# Patient Record
Sex: MALE | Race: WHITE | NOT HISPANIC OR LATINO | Employment: FULL TIME | URBAN - METROPOLITAN AREA
[De-identification: names, ages, dates, MRNs, and addresses within clinical notes are randomized per-mention and may not be internally consistent; named-entity substitution may affect disease eponyms.]

---

## 2017-01-13 ENCOUNTER — OFFICE VISIT (OUTPATIENT)
Dept: ALLERGY | Facility: CLINIC | Age: 57
End: 2017-01-13
Payer: COMMERCIAL

## 2017-01-13 VITALS
SYSTOLIC BLOOD PRESSURE: 110 MMHG | HEART RATE: 75 BPM | HEIGHT: 68 IN | WEIGHT: 251.13 LBS | DIASTOLIC BLOOD PRESSURE: 72 MMHG | BODY MASS INDEX: 38.06 KG/M2 | OXYGEN SATURATION: 95 %

## 2017-01-13 DIAGNOSIS — R51.9 FREQUENT HEADACHES: Chronic | ICD-10-CM

## 2017-01-13 DIAGNOSIS — H10.45 CONJUNCTIVITIS, CHRONIC ALLERGIC: Chronic | ICD-10-CM

## 2017-01-13 DIAGNOSIS — R05.3 CHRONIC COUGH: Chronic | ICD-10-CM

## 2017-01-13 DIAGNOSIS — J32.8 CHRONIC FRONTOETHMOIDAL SINUSITIS: Primary | Chronic | ICD-10-CM

## 2017-01-13 DIAGNOSIS — Z98.890 S/P SINUS SURGERY: ICD-10-CM

## 2017-01-13 DIAGNOSIS — J30.89 ALLERGIC RHINITIS DUE TO HOUSE DUST MITE: Chronic | ICD-10-CM

## 2017-01-13 PROCEDURE — 1159F MED LIST DOCD IN RCRD: CPT | Mod: S$GLB,,, | Performed by: ALLERGY & IMMUNOLOGY

## 2017-01-13 PROCEDURE — 99999 PR PBB SHADOW E&M-EST. PATIENT-LVL III: CPT | Mod: PBBFAC,,, | Performed by: ALLERGY & IMMUNOLOGY

## 2017-01-13 PROCEDURE — 99214 OFFICE O/P EST MOD 30 MIN: CPT | Mod: S$GLB,,, | Performed by: ALLERGY & IMMUNOLOGY

## 2017-01-15 PROBLEM — R51.9 FREQUENT HEADACHES: Chronic | Status: ACTIVE | Noted: 2017-01-15

## 2017-01-15 PROBLEM — J32.8 CHRONIC FRONTOETHMOIDAL SINUSITIS: Chronic | Status: ACTIVE | Noted: 2017-01-15

## 2017-01-15 PROBLEM — H10.45 CONJUNCTIVITIS, CHRONIC ALLERGIC: Chronic | Status: ACTIVE | Noted: 2017-01-15

## 2017-01-15 PROBLEM — J30.89 ALLERGIC RHINITIS DUE TO HOUSE DUST MITE: Chronic | Status: ACTIVE | Noted: 2017-01-15

## 2017-01-15 PROBLEM — Z98.890 S/P SINUS SURGERY: Status: ACTIVE | Noted: 2017-01-15

## 2017-01-15 PROBLEM — R05.3 CHRONIC COUGH: Chronic | Status: ACTIVE | Noted: 2017-01-15

## 2017-01-15 NOTE — PROGRESS NOTES
Referring physician: Self Referral    Primary Care Physician: Primary Doctor No    Chief Complaint: Follow-up (congestion, coughing up phlegm, dark yellow or green in color)    Patient is known to me. The last visit was in private practice on 3/30/2016  Patient is accompanied: No  Diagnosis at previous visit:  1.  Perennial ALLERGIC rhinitis: Symptomatic  2.  Recurrent chronic rhinosinusitis: Symptomatic  3.  Status post sinus balloon surgery November 2015  4.  Frequent headaches related to one and 2.  5.  ALLERGIC conjunctivitis intermittent symptoms    Subjective:         YAS Rasheed is a 56 y.o. male here for a follow-up visit related to chronic nasal symptoms, cough, and sinusitis despite medications.  At his last visit on 3/30/2016 he was prescribed a prednisone pulse with a taper, Cipro 750 mg twice a day for 30 days and Bactroban ointment in his nose twice a day for 30 days.  He had joint pain with the Cipro so that was discontinued after just a few days.  There was a phone call on 6/28/2016 that he was continuing to have nasal and sinus problems.  He was told to see Dr. Malcolm and get nasal sinus cultures so that culture results could guide antibiotic treatment.  He was also told to use a over-the-counter nasal steroid 2 sprays in each nostril no morning as well as budesonide 0.5 mg per ampule with one half of each ampule in his nose while his head was vertical to the floor in the evening.  Patient currently continues to have his chronic symptoms of nasal congestion, postnasal drip, coughing and production of a small amount of phlegm.  It is discolored in nature and usually worse in the morning.  He also reports having frequent headaches.  These symptoms have been relatively persistent since his previous visit to see us in March 2016.  Patient reports that he is currently on immunotherapy with Dr. Malcolm.    Patient denies having ocular symptoms at this time including red watery itchy  eyes  Patient denies having asthma or wheeze or exercise-induced symptoms.      Review of Systems  Constitutional: Negative for changes in appetite, unintentional weight loss, fever, chills and fatigue.   HENT: Negative for facial pain, nose bleeds,and voice change. Negative for ear discharge, ear pain, facial swelling, sore throat and trouble swallowing.  Positive for nasal congestion postnasal drip throat clearing and sinus pressure as well as rhinorrhea  Eyes: Negative for occular discharge, redness, itching and visual disturbance.  Respiratory: Negative for chest tightness, shortness of breath, wheezing, dyspnea on exertion.  Positive for cough which is semi-productive yellow-green phlegm.  Cardiovascular: Negative for chest pain, palpatations and leg swelling.  Gastrointestinal: Negative for abdominal distension, abdominal pain, constipation, diarrhea, nausea,and vomiting.   Genitourinary: Negative for difficulty urinating.   Musculoskeletal: Negative for arthralgias, gait problems, joint swelling, myalgias and back pain.   Neurological: Negative for dizziness, syncope, weakness, light-headedness.  Positive for frequent headaches  Hematological: Negative for adenopathy, does not bruise or bleed easily.  Psychiatric/Behavioral: Negative for agitation, anxiety, behavioral problems, confusion, and insomnia.  Skin: Negative for rash.     PMH:  Reviewed with the patient and current for this visit    Family History:  Reviewed with the patient and current for this visit    Social History:  Reviewed with the patient and current for this visit     Environmental History:  Reviewed with the patient and current for this visit          Objective:      Skin Test results: Patient has positive prick skin test house dust mites    Immunotherapy: Patient has been on immunotherapy in the past from 11/3011 28/8/2012.  There was premature discontinuation of his immunotherapy and he never achieved maintenance dose.    Physical  Exam  General:patient is well developed and well nourished, in no acute distress.  Mental Status:  Alert, oriented and cooperative  Head and Face: normocephalic   Allergic shiners: No  Eyes:   Pupils: ERRLA: Scleral conjunctiva: clear; Cornea: clear; Palprebal conjunctiva: normal: Eyelid Skin: normal  Ears:Tympanic membrane Left:intact and normal light reflex; Right:intact and normal light reflex; External canals normal bilaterally.  Nose:  Nares: patent; Mucosa :pink and moist; Nasal septum: midline;Turbinates are of normal size bilaterally and do not occlude the nasal airway.  Mouth/Pharynx: tonsils present; posterior pharyngeal wall normal; tongue normal; teeth normal; voice quality normal.  Neck:  Cervical lymph nodes: small, non-tender, freely moveable both anterior and posterior cervical chain; Trachea: midline; Masses: none  Lungs: Air movement is good; respiratory effort is good; no respiratory distress; breath sounds are vesicular in all lung fields; no wheezing; normal expiratory time; no cough.  Heart: regular rate and rhythm with mild respiratory variation; A1 and P2 are normal; no murmurs or gallops.  Abdomen:exam not done  Extremities: no cyanosis, clubbing or edema  Skin:no rashes or lesions present; skin hydrated and supple.        Laboratory Results: Immune studies done on 1/28/2015 demonstrated a low IgG subclass 1 at 275 MG/DL he also had 5 out of 23 pneumococcal serotypes at protective levels, his Haemophilus influenza B antibody level was unprotected, tetanus was at protective levels and psoas diphtheria.  His complement levels were normal as well as his neutrophils function.  He also had normal total levels were IgG A, IgG G, and IgM.  His IgE level was 77.  On 12/28/2015 is post-pneumococcal titers were protective at 18/23.  He never received his some off was influenza B vaccine.            Assessment:       1. Chronic frontoethmoidal sinusitis     2. Chronic cough     3. Allergic rhinitis due  to house dust mite     4. Frequent headaches     5. Conjunctivitis, chronic allergic     6. S/P sinus surgery             Plan:         Return for re-visit: Return in about 2 months (around 3/13/2017).    Immunotherapy: Yes; continue immunotherapy as Dr. Malcolm for now    Lab or X-ray: No; but will consider CT of the sinus at a later date    Outside medical records requested: No        Patient Instructions   1.  Patient is to see Dr. Malcolm to obtain sinus cultures for culture driven antibiotic therapy.  He will continue to use nasal saline  2.  After culture and sensitivities are available then I will start an appropriate antibiotic, along with an oral prednisone pulse, also budesonide 0.5 mL per ampule one half ampule in each nostril at bedtime.  I will also start an a.m. nasal steroid.  3.  Patient will call prior to revisit if there are problems.      25 minutes spent face to face with this patient. 50% of time spent counseling this patient about the medical conditions (pathophysiology), the options and strategies for treatments, and the risks and benefits of treatments.    Patient education content included:  Nasal sinus physiology in the role of inflammatory changes in the production of nasal symptoms sinus symptoms and sinus infections.  The importance of obtaining bacterial cultures to determine appropriate antibiotic choices.  The importance of using coordinated medical management to control the courtesy the symptoms.  The fact that the previous immunological evaluation did not demonstrate any immune issue which needs to be remediated related to his chronic infection.  Patient voiced understanding of these issues and questions were answered.        Cheri Hood MD

## 2017-01-15 NOTE — PATIENT INSTRUCTIONS
1.  Patient is to see Dr. Malcolm to obtain sinus cultures for culture driven antibiotic therapy.  He will continue to use nasal saline  2.  After culture and sensitivities are available then I will start an appropriate antibiotic, along with an oral prednisone pulse, also budesonide 0.5 mL per ampule one half ampule in each nostril at bedtime.  I will also start an a.m. nasal steroid.  3.  Patient will call prior to revisit if there are problems.

## 2017-03-31 ENCOUNTER — TELEPHONE (OUTPATIENT)
Dept: ALLERGY | Facility: CLINIC | Age: 57
End: 2017-03-31

## 2017-03-31 NOTE — TELEPHONE ENCOUNTER
Patient sent me text message that he stated with sinusitis on Tuesday this week 3/28/2017.  Will Rx doxycycline 100 mg BID for 14 days.-[id number]

## 2017-04-07 ENCOUNTER — TELEPHONE (OUTPATIENT)
Dept: ALLERGY | Facility: CLINIC | Age: 57
End: 2017-04-07

## 2017-04-07 NOTE — TELEPHONE ENCOUNTER
----- Message from Anil Storey sent at 3/31/2017  3:36 PM CDT -----  Contact: self [phone]  Patient is calling to check states of medication . Please advise , Thanks !

## 2017-04-07 NOTE — TELEPHONE ENCOUNTER
Patient is calling about meds.  i am not sure what you would like for him to have other than prednisone, budesonide and some type of antibiotic after he has a culture and sensitivity done. Unfortunately i do not see where any type of culture was done.  lLeft message for patient to call and let us know what med he is talking about.

## 2017-04-07 NOTE — TELEPHONE ENCOUNTER
----- Message from Shruthi Acevedo sent at 3/31/2017  8:51 AM CDT -----  Contact: Self/[phone]  Patient would like to get medical advice.  Symptoms :  Sinus infection  How long has patient had these symptoms:    [facility] and [facility] #[facility] [phone]  Any drug allergies:  On file   Comments: pt said that he has a sinus infection and wants to know if he can have a rx sent to the pharmacy. Please call and advise        Thank you

## 2017-04-07 NOTE — TELEPHONE ENCOUNTER
LOV-1/13/17.  i do not see where this medication has been ordered by you.  Cannot pend.  Would you like him to have it.

## 2017-04-07 NOTE — TELEPHONE ENCOUNTER
----- Message from Noemi Terrell MA sent at 4/5/2017  3:38 PM CDT -----  Contact: [facility] fax [phone]      ----- Message -----     From: Paradise Khalil MA     Sent: 4/5/2017   2:21 PM       To: [facility]anna Tee Staff    Type: Rx    Name of medication(s): Mupirocin 2% Ointment    Is this a refill? New rx? refill    Who prescribed medication?Cynthia Reed      Pharmacy Name, Phone, & Location:[facility] on file    Comments:Please advise

## 2017-04-07 NOTE — TELEPHONE ENCOUNTER
----- Message from Paradise Khalil MA sent at 4/5/2017  2:21 PM CDT -----  Contact: [facility] fax [phone]  Type: Rx    Name of medication(s): Mupirocin 2% Ointment    Is this a refill? New rx? refill    Who prescribed medication?Cynthia Reed      Pharmacy Name, Phone, & Location:[facility] on file    Comments:Please advise

## 2017-04-19 ENCOUNTER — TELEPHONE (OUTPATIENT)
Dept: INTERNAL MEDICINE | Facility: CLINIC | Age: 57
End: 2017-04-19
Payer: COMMERCIAL

## 2017-04-19 PROCEDURE — 99499 UNLISTED E&M SERVICE: CPT | Mod: S$GLB,,, | Performed by: ALLERGY & IMMUNOLOGY

## 2017-04-19 RX ORDER — MUPIROCIN 20 MG/G
OINTMENT TOPICAL 3 TIMES DAILY
Qty: 30 G | Refills: 0 | Status: SHIPPED | OUTPATIENT
Start: 2017-04-19

## 2017-04-19 NOTE — TELEPHONE ENCOUNTER
Pt would like a refill on Mupirocin 2% ointment. Pt would like this refill sent to the [facility] on file.   Please advise

## 2017-04-19 NOTE — TELEPHONE ENCOUNTER
----- Message from Paradise Khalil MA sent at 4/18/2017  4:43 PM CDT -----  Contact: [facility] [phone]   Type: Rx    Name of medication(s): Mupirocin 2% ointment    Is this a refill? New rx? refill    Who prescribed medication?Cynthia Reed    Pharmacy Name, Phone, & Location:[facility] on file    Comments:please advise    Thanks

## 2017-06-16 RX ORDER — DOXYCYCLINE 100 MG/1
100 CAPSULE ORAL 2 TIMES DAILY
Refills: 1 | Status: CANCELLED | OUTPATIENT
Start: 2017-06-16

## 2017-06-16 RX ORDER — DOXYCYCLINE 100 MG/1
100 CAPSULE ORAL 2 TIMES DAILY
Refills: 1 | COMMUNITY
Start: 2017-03-31

## 2017-06-24 NOTE — TELEPHONE ENCOUNTER
At his last visit I told him to get cultures for culture driven antibiotics. Since there are several requests for antibiotics since the 1/13/2017 visit, my advise is still the same.Please call patient and advise him. Thanks    Plan from 1/13/2017:  1.  Patient is to see Dr. Malcolm to obtain sinus cultures for culture driven antibiotic therapy.  He will continue to use nasal saline  2.  After culture and sensitivities are available then I will start an appropriate antibiotic, along with an oral prednisone pulse, also budesonide 0.5 mL per ampule one half ampule in each nostril at bedtime.  I will also start an a.m. nasal steroid.  3.  Patient will call prior to revisit if there are problems.

## 2017-06-26 NOTE — TELEPHONE ENCOUNTER
Patient was just seen by Dr. Le on 5/11/17.  Office is sending copy of lab reports and office visit to us.

## 2017-08-08 DIAGNOSIS — J01.00 ACUTE NON-RECURRENT MAXILLARY SINUSITIS: ICD-10-CM

## 2017-08-08 RX ORDER — MONTELUKAST SODIUM 10 MG/1
10 TABLET ORAL NIGHTLY
Qty: 90 TABLET | Refills: 3 | OUTPATIENT
Start: 2017-08-08 | End: 2018-08-08

## 2017-08-08 RX ORDER — MONTELUKAST SODIUM 10 MG/1
10 TABLET ORAL NIGHTLY
Qty: 90 TABLET | Refills: 3 | Status: SHIPPED | OUTPATIENT
Start: 2017-08-08 | End: 2018-08-08

## 2017-08-08 RX ORDER — CLARITHROMYCIN 500 MG/1
TABLET, FILM COATED, EXTENDED RELEASE ORAL
Qty: 60 TABLET | Refills: 0 | OUTPATIENT
Start: 2017-08-08

## 2017-08-09 ENCOUNTER — PATIENT MESSAGE (OUTPATIENT)
Dept: ALLERGY | Facility: CLINIC | Age: 57
End: 2017-08-09

## 2017-08-09 NOTE — TELEPHONE ENCOUNTER
Patient notified that Dr. Hood  Is currently on FMLA and per Lorene Adkins, she stated that he would need to be seen in our office before antibiotics could be ordered for him.

## 2017-08-15 RX ORDER — MONTELUKAST SODIUM 10 MG/1
10 TABLET ORAL NIGHTLY
Qty: 90 TABLET | Refills: 3 | Status: CANCELLED | OUTPATIENT
Start: 2017-08-15 | End: 2018-08-15

## 2017-08-16 ENCOUNTER — PATIENT MESSAGE (OUTPATIENT)
Dept: ALLERGY | Facility: CLINIC | Age: 57
End: 2017-08-16

## 2017-08-27 RX ORDER — DOXYCYCLINE 100 MG/1
CAPSULE ORAL
Qty: 28 CAPSULE | Refills: 1 | OUTPATIENT
Start: 2017-08-27

## 2017-09-21 ENCOUNTER — PATIENT MESSAGE (OUTPATIENT)
Dept: ALLERGY | Facility: CLINIC | Age: 57
End: 2017-09-21

## 2017-10-26 RX ORDER — MONTELUKAST SODIUM 10 MG/1
TABLET ORAL
Qty: 330 TABLET | Refills: 0 | OUTPATIENT
Start: 2017-10-26

## 2018-05-17 ENCOUNTER — CLINICAL SUPPORT (OUTPATIENT)
Dept: OTHER | Facility: CLINIC | Age: 58
End: 2018-05-17
Payer: COMMERCIAL

## 2018-05-17 DIAGNOSIS — Z00.8 HEALTH EXAMINATION IN POPULATION SURVEYS: Primary | ICD-10-CM

## 2018-05-17 PROCEDURE — 99401 PREV MED CNSL INDIV APPRX 15: CPT | Mod: S$GLB,,, | Performed by: INTERNAL MEDICINE

## 2018-05-17 PROCEDURE — 82947 ASSAY GLUCOSE BLOOD QUANT: CPT | Mod: QW,S$GLB,, | Performed by: INTERNAL MEDICINE

## 2018-05-17 PROCEDURE — 80061 LIPID PANEL: CPT | Mod: QW,S$GLB,, | Performed by: INTERNAL MEDICINE

## 2018-05-18 VITALS
HEIGHT: 68 IN | DIASTOLIC BLOOD PRESSURE: 77 MMHG | SYSTOLIC BLOOD PRESSURE: 117 MMHG | WEIGHT: 204 LBS | BODY MASS INDEX: 30.92 KG/M2

## 2018-05-18 LAB
GLUCOSE SERPL-MCNC: 99 MG/DL (ref 60–140)
POC CHOLESTEROL, HDL: 47 MG/DL (ref 40–?)
POC CHOLESTEROL, LDL: 168 MG/DL (ref ?–160)
POC CHOLESTEROL, TOTAL: 234 MG/DL (ref ?–240)
POC GLUCOSE FASTING: ABNORMAL MG/DL (ref 60–110)
POC TOTAL CHOLESTEROL / HDL RATIO: 4.98 (ref ?–6)
POC TRIGLYCERIDES: 95 MG/DL (ref ?–160)

## 2018-05-31 NOTE — PROGRESS NOTES
Patient consulted at health screening per SUBHASH Lombardi RN. Discussed dietary changes decrease LDL. Only sees PCP when needed. Encouraged annual visits.

## 2023-01-10 ENCOUNTER — OFFICE VISIT (OUTPATIENT)
Dept: OPHTHALMOLOGY | Facility: CLINIC | Age: 63
End: 2023-01-10
Attending: OPHTHALMOLOGY
Payer: COMMERCIAL

## 2023-01-10 DIAGNOSIS — H25.13 NUCLEAR SCLEROSIS, BILATERAL: Primary | ICD-10-CM

## 2023-01-10 PROCEDURE — 1160F PR REVIEW ALL MEDS BY PRESCRIBER/CLIN PHARMACIST DOCUMENTED: ICD-10-PCS | Mod: CPTII,S$GLB,, | Performed by: OPHTHALMOLOGY

## 2023-01-10 PROCEDURE — 99999 PR PBB SHADOW E&M-NEW PATIENT-LVL II: CPT | Mod: PBBFAC,,, | Performed by: OPHTHALMOLOGY

## 2023-01-10 PROCEDURE — 1159F MED LIST DOCD IN RCRD: CPT | Mod: CPTII,S$GLB,, | Performed by: OPHTHALMOLOGY

## 2023-01-10 PROCEDURE — 1159F PR MEDICATION LIST DOCUMENTED IN MEDICAL RECORD: ICD-10-PCS | Mod: CPTII,S$GLB,, | Performed by: OPHTHALMOLOGY

## 2023-01-10 PROCEDURE — 92004 COMPRE OPH EXAM NEW PT 1/>: CPT | Mod: S$GLB,,, | Performed by: OPHTHALMOLOGY

## 2023-01-10 PROCEDURE — 92004 PR EYE EXAM, NEW PATIENT,COMPREHESV: ICD-10-PCS | Mod: S$GLB,,, | Performed by: OPHTHALMOLOGY

## 2023-01-10 PROCEDURE — 99999 PR PBB SHADOW E&M-NEW PATIENT-LVL II: ICD-10-PCS | Mod: PBBFAC,,, | Performed by: OPHTHALMOLOGY

## 2023-01-10 PROCEDURE — 1160F RVW MEDS BY RX/DR IN RCRD: CPT | Mod: CPTII,S$GLB,, | Performed by: OPHTHALMOLOGY

## 2023-01-10 NOTE — PROGRESS NOTES
HPI     Cataract            Comments: Jed Rasheed is a 63 y/o male           Comments    Pt here for Cataract Evaluation   Pt state need more light to see.   Pt denies f/f. No other complaints at this time           Last edited by Julissa Gonzalez on 1/10/2023  1:46 PM.            Assessment /Plan     For exam results, see Encounter Report.    Nuclear sclerosis, bilateral    Incipient cataract: Patient does reports mild visual decline, but not sufficient to affect activities of daily living. I recommend monitoring visual status and follow up when visual symptoms worsen.

## 2024-02-05 ENCOUNTER — TELEPHONE (OUTPATIENT)
Dept: OPHTHALMOLOGY | Facility: CLINIC | Age: 64
End: 2024-02-05
Payer: COMMERCIAL

## 2024-02-05 NOTE — TELEPHONE ENCOUNTER
Pt called c/o new floater OS No headaches, flashes, curtain or veil. [facility] OU no decrease in one year. Chris appt for tomorrow ret eval

## 2024-02-06 ENCOUNTER — OFFICE VISIT (OUTPATIENT)
Dept: OPHTHALMOLOGY | Facility: CLINIC | Age: 64
End: 2024-02-06
Payer: COMMERCIAL

## 2024-02-06 DIAGNOSIS — H43.821 VITREOMACULAR ADHESION, RIGHT: ICD-10-CM

## 2024-02-06 DIAGNOSIS — H43.12 VITREOUS HEMORRHAGE, LEFT: ICD-10-CM

## 2024-02-06 DIAGNOSIS — H43.812 POSTERIOR VITREOUS DETACHMENT, LEFT: Primary | ICD-10-CM

## 2024-02-06 DIAGNOSIS — H25.13 AGE-RELATED NUCLEAR CATARACT OF BOTH EYES: ICD-10-CM

## 2024-02-06 PROCEDURE — 99999 PR PBB SHADOW E&M-EST. PATIENT-LVL I: CPT | Mod: PBBFAC,,, | Performed by: OPHTHALMOLOGY

## 2024-02-06 PROCEDURE — 99214 OFFICE O/P EST MOD 30 MIN: CPT | Mod: S$GLB,,, | Performed by: OPHTHALMOLOGY

## 2024-02-06 PROCEDURE — 92201 OPSCPY EXTND RTA DRAW UNI/BI: CPT | Mod: S$GLB,,, | Performed by: OPHTHALMOLOGY

## 2024-02-06 NOTE — PROGRESS NOTES
HPI    Here for floater check. New floaters left eye yday  Last edited by Pavel Valera MD on 2/6/2024  2:25 PM.         A/P    ICD-10-CM ICD-9-CM   1. Posterior vitreous detachment, left  H43.812 379.21   2. Vitreous hemorrhage, left  H43.12 379.23   3. Vitreomacular adhesion, right  H43.821 379.27   4. Age-related nuclear cataract of both eyes  H25.13 366.16       1. Posterior vitreous detachment, left  2. Vitreous hemorrhage, left  Here for new floater eval   Noted new floaters yday, no trauma    Exam notable for hemorrhagic PVD, no RT/RD with , scant heme in, no RT  Plan: Observation, counseled on RT/RD risk given fresh PVD, monitor, recheck 1 mo   Pathology of PVD, Retinal Tear, Retinal Detachment reviewed in great detail  RD precautions discussed in detail, patient expressed understanding  RTC immediately PRN (especially ANY change flashes, floaters, vision, visual field)     3. Vitreomacular adhesion, right  No RT/RD, attached  Plan: Observation, counseled on future PVD symptoms     4. Age-related nuclear cataract of both eyes  Mild NS, NVS  Plan: Observation Update Mrx prn     RTC 1 mo DFE/OCTm OU monitor heme/floaters OS         I saw and examined the patient and reviewed in detail the findings documented. The final examination findings, image interpretations which have been independently interpreted, and plan as documented in the record represent my personal judgment and conclusions.    Pavel Valera MD  Vitreoretinal Surgery   [facility]

## 2024-03-08 ENCOUNTER — OFFICE VISIT (OUTPATIENT)
Dept: OPHTHALMOLOGY | Facility: CLINIC | Age: 64
End: 2024-03-08
Payer: COMMERCIAL

## 2024-03-08 ENCOUNTER — TELEPHONE (OUTPATIENT)
Dept: OPTOMETRY | Facility: CLINIC | Age: 64
End: 2024-03-08
Payer: COMMERCIAL

## 2024-03-08 DIAGNOSIS — H43.821 VITREOMACULAR ADHESION, RIGHT: ICD-10-CM

## 2024-03-08 DIAGNOSIS — H43.12 VITREOUS HEMORRHAGE, LEFT: ICD-10-CM

## 2024-03-08 DIAGNOSIS — H43.812 POSTERIOR VITREOUS DETACHMENT, LEFT: Primary | ICD-10-CM

## 2024-03-08 DIAGNOSIS — H25.13 AGE-RELATED NUCLEAR CATARACT OF BOTH EYES: ICD-10-CM

## 2024-03-08 PROCEDURE — 1160F RVW MEDS BY RX/DR IN RCRD: CPT | Mod: CPTII,S$GLB,, | Performed by: OPHTHALMOLOGY

## 2024-03-08 PROCEDURE — 99999 PR PBB SHADOW E&M-EST. PATIENT-LVL III: CPT | Mod: PBBFAC,,, | Performed by: OPHTHALMOLOGY

## 2024-03-08 PROCEDURE — 92201 OPSCPY EXTND RTA DRAW UNI/BI: CPT | Mod: S$GLB,,, | Performed by: OPHTHALMOLOGY

## 2024-03-08 PROCEDURE — 1159F MED LIST DOCD IN RCRD: CPT | Mod: CPTII,S$GLB,, | Performed by: OPHTHALMOLOGY

## 2024-03-08 PROCEDURE — 92014 COMPRE OPH EXAM EST PT 1/>: CPT | Mod: S$GLB,,, | Performed by: OPHTHALMOLOGY

## 2024-03-08 PROCEDURE — 92134 CPTRZ OPH DX IMG PST SGM RTA: CPT | Mod: S$GLB,,, | Performed by: OPHTHALMOLOGY

## 2024-03-08 RX ORDER — ATORVASTATIN CALCIUM 20 MG/1
20 TABLET, FILM COATED ORAL
COMMUNITY

## 2024-03-08 RX ORDER — MELOXICAM 15 MG/1
15 TABLET ORAL DAILY PRN
COMMUNITY

## 2024-03-08 NOTE — PROGRESS NOTES
HPI    Floaters OS still there but no new floaters. No flashes. VA OU otherwise   stable.  Eye meds: None  Last edited by Bita Tim on 3/8/2024  7:57 AM.         A/P    ICD-10-CM ICD-9-CM   1. Posterior vitreous detachment, left  H43.812 379.21   2. Vitreous hemorrhage, left  H43.12 379.23   3. Vitreomacular adhesion, right  H43.821 379.27   4. Age-related nuclear cataract of both eyes  H25.13 366.16       1. Posterior vitreous detachment, left  2. Vitreous hemorrhage, left  Here for floater f/u    Pt feels floaters are better, no flashes    Exam notable for stable PVD, resolved inf heme, no RT/RD with     Plan: Observation, counseled on RT/RD risk    Pathology of PVD, Retinal Tear, Retinal Detachment reviewed in great detail  RD precautions discussed in detail, patient expressed understanding  RTC immediately PRN (especially ANY change flashes, floaters, vision, visual field)     3. Vitreomacular adhesion, right  No RT/RD, attached  Plan: Observation, counseled on future PVD symptoms     4. Age-related nuclear cataract of both eyes  Mild NS, NVS  Plan: Observation Update Mrx prn      RTC   [facility] annual f/u        I saw and examined the patient and reviewed in detail the findings documented. The final examination findings, image interpretations which have been independently interpreted, and plan as documented in the record represent my personal judgment and conclusions.    Pavel Valera MD  Vitreoretinal Surgery   [facility]

## 2024-03-08 NOTE — TELEPHONE ENCOUNTER
----- Message from Bita Tim sent at 3/8/2024  8:27 AM CST -----  Regarding: appt  Pt would like an appt at [facility] for annual.